# Patient Record
Sex: FEMALE | Race: WHITE | NOT HISPANIC OR LATINO | URBAN - METROPOLITAN AREA
[De-identification: names, ages, dates, MRNs, and addresses within clinical notes are randomized per-mention and may not be internally consistent; named-entity substitution may affect disease eponyms.]

---

## 2019-06-25 ENCOUNTER — INPATIENT (INPATIENT)
Facility: HOSPITAL | Age: 24
LOS: 1 days | Discharge: HOME | End: 2019-06-27
Attending: PSYCHIATRY & NEUROLOGY | Admitting: PSYCHIATRY & NEUROLOGY
Payer: COMMERCIAL

## 2019-06-25 VITALS
RESPIRATION RATE: 14 BRPM | TEMPERATURE: 98 F | SYSTOLIC BLOOD PRESSURE: 120 MMHG | DIASTOLIC BLOOD PRESSURE: 73 MMHG | OXYGEN SATURATION: 99 % | HEART RATE: 108 BPM

## 2019-06-25 DIAGNOSIS — F90.0 ATTENTION-DEFICIT HYPERACTIVITY DISORDER, PREDOMINANTLY INATTENTIVE TYPE: ICD-10-CM

## 2019-06-25 DIAGNOSIS — F33.9 MAJOR DEPRESSIVE DISORDER, RECURRENT, UNSPECIFIED: ICD-10-CM

## 2019-06-25 DIAGNOSIS — F41.0 PANIC DISORDER [EPISODIC PAROXYSMAL ANXIETY]: ICD-10-CM

## 2019-06-25 DIAGNOSIS — F33.2 MAJOR DEPRESSIVE DISORDER, RECURRENT SEVERE WITHOUT PSYCHOTIC FEATURES: ICD-10-CM

## 2019-06-25 LAB
ALBUMIN SERPL ELPH-MCNC: 4.9 G/DL — SIGNIFICANT CHANGE UP (ref 3.3–5)
ALP SERPL-CCNC: 89 U/L — SIGNIFICANT CHANGE UP (ref 40–120)
ALT FLD-CCNC: 51 U/L — HIGH (ref 4–33)
AMPHET UR-MCNC: NEGATIVE — SIGNIFICANT CHANGE UP
ANION GAP SERPL CALC-SCNC: 17 MMO/L — HIGH (ref 7–14)
APAP SERPL-MCNC: < 15 UG/ML — LOW (ref 15–25)
APPEARANCE UR: SIGNIFICANT CHANGE UP
AST SERPL-CCNC: 21 U/L — SIGNIFICANT CHANGE UP (ref 4–32)
BACTERIA # UR AUTO: NEGATIVE — SIGNIFICANT CHANGE UP
BARBITURATES UR SCN-MCNC: NEGATIVE — SIGNIFICANT CHANGE UP
BENZODIAZ UR-MCNC: NEGATIVE — SIGNIFICANT CHANGE UP
BILIRUB SERPL-MCNC: < 0.2 MG/DL — LOW (ref 0.2–1.2)
BILIRUB UR-MCNC: NEGATIVE — SIGNIFICANT CHANGE UP
BLOOD UR QL VISUAL: NEGATIVE — SIGNIFICANT CHANGE UP
BUN SERPL-MCNC: 11 MG/DL — SIGNIFICANT CHANGE UP (ref 7–23)
CALCIUM SERPL-MCNC: 9.7 MG/DL — SIGNIFICANT CHANGE UP (ref 8.4–10.5)
CANNABINOIDS UR-MCNC: NEGATIVE — SIGNIFICANT CHANGE UP
CHLORIDE SERPL-SCNC: 103 MMOL/L — SIGNIFICANT CHANGE UP (ref 98–107)
CO2 SERPL-SCNC: 21 MMOL/L — LOW (ref 22–31)
COCAINE METAB.OTHER UR-MCNC: NEGATIVE — SIGNIFICANT CHANGE UP
COLOR SPEC: YELLOW — SIGNIFICANT CHANGE UP
CREAT SERPL-MCNC: 0.54 MG/DL — SIGNIFICANT CHANGE UP (ref 0.5–1.3)
ETHANOL BLD-MCNC: 206 MG/DL — HIGH
GLUCOSE SERPL-MCNC: 100 MG/DL — HIGH (ref 70–99)
GLUCOSE UR-MCNC: NEGATIVE — SIGNIFICANT CHANGE UP
HCG SERPL-ACNC: < 5 MIU/ML — SIGNIFICANT CHANGE UP
HCT VFR BLD CALC: 38.6 % — SIGNIFICANT CHANGE UP (ref 34.5–45)
HGB BLD-MCNC: 12.8 G/DL — SIGNIFICANT CHANGE UP (ref 11.5–15.5)
HYALINE CASTS # UR AUTO: NEGATIVE — SIGNIFICANT CHANGE UP
KETONES UR-MCNC: SIGNIFICANT CHANGE UP
LEUKOCYTE ESTERASE UR-ACNC: NEGATIVE — SIGNIFICANT CHANGE UP
MCHC RBC-ENTMCNC: 30.6 PG — SIGNIFICANT CHANGE UP (ref 27–34)
MCHC RBC-ENTMCNC: 33.2 % — SIGNIFICANT CHANGE UP (ref 32–36)
MCV RBC AUTO: 92.3 FL — SIGNIFICANT CHANGE UP (ref 80–100)
METHADONE UR-MCNC: NEGATIVE — SIGNIFICANT CHANGE UP
NITRITE UR-MCNC: NEGATIVE — SIGNIFICANT CHANGE UP
NRBC # FLD: 0 K/UL — SIGNIFICANT CHANGE UP (ref 0–0)
OPIATES UR-MCNC: NEGATIVE — SIGNIFICANT CHANGE UP
OXYCODONE UR-MCNC: NEGATIVE — SIGNIFICANT CHANGE UP
PCP UR-MCNC: NEGATIVE — SIGNIFICANT CHANGE UP
PH UR: 7 — SIGNIFICANT CHANGE UP (ref 5–8)
PLATELET # BLD AUTO: 345 K/UL — SIGNIFICANT CHANGE UP (ref 150–400)
PMV BLD: 8 FL — SIGNIFICANT CHANGE UP (ref 7–13)
POTASSIUM SERPL-MCNC: 4.1 MMOL/L — SIGNIFICANT CHANGE UP (ref 3.5–5.3)
POTASSIUM SERPL-SCNC: 4.1 MMOL/L — SIGNIFICANT CHANGE UP (ref 3.5–5.3)
PROT SERPL-MCNC: 6.9 G/DL — SIGNIFICANT CHANGE UP (ref 6–8.3)
PROT UR-MCNC: NEGATIVE — SIGNIFICANT CHANGE UP
RBC # BLD: 4.18 M/UL — SIGNIFICANT CHANGE UP (ref 3.8–5.2)
RBC # FLD: 12.4 % — SIGNIFICANT CHANGE UP (ref 10.3–14.5)
RBC CASTS # UR COMP ASSIST: SIGNIFICANT CHANGE UP (ref 0–?)
SALICYLATES SERPL-MCNC: < 5 MG/DL — LOW (ref 15–30)
SODIUM SERPL-SCNC: 141 MMOL/L — SIGNIFICANT CHANGE UP (ref 135–145)
SP GR SPEC: 1.01 — SIGNIFICANT CHANGE UP (ref 1–1.04)
SQUAMOUS # UR AUTO: SIGNIFICANT CHANGE UP
TSH SERPL-MCNC: 7.43 UIU/ML — HIGH (ref 0.27–4.2)
UROBILINOGEN FLD QL: NORMAL — SIGNIFICANT CHANGE UP
WBC # BLD: 6.32 K/UL — SIGNIFICANT CHANGE UP (ref 3.8–10.5)
WBC # FLD AUTO: 6.32 K/UL — SIGNIFICANT CHANGE UP (ref 3.8–10.5)
WBC UR QL: SIGNIFICANT CHANGE UP (ref 0–?)

## 2019-06-25 PROCEDURE — 99285 EMERGENCY DEPT VISIT HI MDM: CPT

## 2019-06-25 RX ORDER — IBUPROFEN 200 MG
400 TABLET ORAL EVERY 8 HOURS
Refills: 0 | Status: DISCONTINUED | OUTPATIENT
Start: 2019-06-25 | End: 2019-06-27

## 2019-06-25 RX ORDER — LEVOTHYROXINE SODIUM 125 MCG
175 TABLET ORAL DAILY
Refills: 0 | Status: DISCONTINUED | OUTPATIENT
Start: 2019-06-26 | End: 2019-06-27

## 2019-06-25 RX ORDER — LISDEXAMFETAMINE DIMESYLATE 70 MG/1
1 CAPSULE ORAL
Qty: 0 | Refills: 0 | DISCHARGE

## 2019-06-25 RX ORDER — DULOXETINE HYDROCHLORIDE 30 MG/1
60 CAPSULE, DELAYED RELEASE ORAL ONCE
Refills: 0 | Status: COMPLETED | OUTPATIENT
Start: 2019-06-25 | End: 2019-06-25

## 2019-06-25 RX ORDER — DULOXETINE HYDROCHLORIDE 30 MG/1
30 CAPSULE, DELAYED RELEASE ORAL DAILY
Refills: 0 | Status: DISCONTINUED | OUTPATIENT
Start: 2019-06-25 | End: 2019-06-25

## 2019-06-25 RX ORDER — LEVOTHYROXINE SODIUM 125 MCG
175 TABLET ORAL ONCE
Refills: 0 | Status: COMPLETED | OUTPATIENT
Start: 2019-06-25 | End: 2019-06-25

## 2019-06-25 RX ORDER — DULOXETINE HYDROCHLORIDE 30 MG/1
1 CAPSULE, DELAYED RELEASE ORAL
Qty: 0 | Refills: 0 | DISCHARGE

## 2019-06-25 RX ORDER — DULOXETINE HYDROCHLORIDE 30 MG/1
60 CAPSULE, DELAYED RELEASE ORAL DAILY
Refills: 0 | Status: DISCONTINUED | OUTPATIENT
Start: 2019-06-26 | End: 2019-06-27

## 2019-06-25 RX ORDER — LISDEXAMFETAMINE DIMESYLATE 70 MG/1
70 CAPSULE ORAL DAILY
Refills: 0 | Status: CANCELLED | OUTPATIENT
Start: 2019-06-28 | End: 2019-06-27

## 2019-06-25 RX ADMIN — Medication 175 MICROGRAM(S): at 16:29

## 2019-06-25 RX ADMIN — Medication 0.5 MILLIGRAM(S): at 21:40

## 2019-06-25 RX ADMIN — DULOXETINE HYDROCHLORIDE 60 MILLIGRAM(S): 30 CAPSULE, DELAYED RELEASE ORAL at 16:29

## 2019-06-25 RX ADMIN — Medication 400 MILLIGRAM(S): at 14:02

## 2019-06-25 NOTE — ED BEHAVIORAL HEALTH ASSESSMENT NOTE - PSYCHIATRIC ISSUES AND PLAN (INCLUDE STANDING AND PRN MEDICATION)
taper off cymbalta and consider starting wellbutrin (per patient, good familial response). continue Vyvanse and prn ativan.

## 2019-06-25 NOTE — ED ADULT NURSE NOTE - NS PRO PASSIVE SMOKE EXP
No further bleeding from rectum.  Continue with xarelto.  
Ventricular rates uncontrolled at today's visit.  Will increase Toprol to 50mg BID.  Return in 1 week for evaluation of heart rates.  
Unknown

## 2019-06-25 NOTE — ED ADULT NURSE NOTE - NSIMPLEMENTINTERV_GEN_ALL_ED
Implemented All Universal Safety Interventions:  [place] to call system. Call bell, personal items and telephone within reach. Instruct patient to call for assistance. Room bathroom lighting operational. Non-slip footwear when patient is off stretcher. Physically safe environment: no spills, clutter or unnecessary equipment. Stretcher in lowest position, wheels locked, appropriate side rails in place.

## 2019-06-25 NOTE — ED ADULT NURSE REASSESSMENT NOTE - NS ED NURSE REASSESS COMMENT FT1
pt transported to [facility] with security and PCA pascle. pt is calm and cooperative. pts belongings with family member.

## 2019-06-25 NOTE — ED BEHAVIORAL HEALTH ASSESSMENT NOTE - OTHER PAST PSYCHIATRIC HISTORY (INCLUDE DETAILS REGARDING ONSET, COURSE OF ILLNESS, INPATIENT/OUTPATIENT TREATMENT)
PPH of ADHD and anxiety, no prior inpatient admissions, no prior SA/SIB until today, in treatment with Dr. Salcido

## 2019-06-25 NOTE — ED BEHAVIORAL HEALTH ASSESSMENT NOTE - SUICIDE PROTECTIVE FACTORS
Engaged in work or school/Responsibility to family and others/Supportive social network or family/Future oriented/Identifies reasons for living

## 2019-06-25 NOTE — ED BEHAVIORAL HEALTH ASSESSMENT NOTE - SUMMARY
25yo  female, single, domiciled in an apartment in [place], employed as a  in [place], PPH of ADHD and anxiety, no prior inpatient admissions, no prior SA/SIB until today, in treatment with Dr. Salcido, no significant history of substance abuse, PMH of Graves Disease, brought in by mother for evaluation of depression and anxiety after patient cut her abdomen several times.     Patient presents with her mother after superficially cutting her abdomen. Patient cannot clearly state whether this was a suicide attempt or out of frustration. She and mother both report that patient has been on the decline for the past two months and is not at her baseline. Both patient and mother requesting voluntary admission at this time for safety and stabilization.

## 2019-06-25 NOTE — ED BEHAVIORAL HEALTH NOTE - BEHAVIORAL HEALTH NOTE
ED psychiatric reassessment    CC: "Been depressed for the past month."    S:  25yo  female, single, domiciled in an apartment in [place], employed as a  in [place], PPH of ADHD and anxiety, no prior inpatient admissions, no prior SA/SIB until today, in treatment with Dr. Salcido, no significant history of substance abuse, PMH of Graves Disease, brought in by mother for evaluation of depression and anxiety after patient cut her abdomen several times.  Patient presented with her mother after superficially cutting her abdomen. Patient cannot clearly state whether this was a suicide attempt or out of frustration. She and mother both report that patient has been on the decline for the past two months and is not at her baseline. Both patient and mother requesting voluntary admission at this time for safety and stabilization which patient is appropriate for and agreeable to.    Vital Signs Last 24 Hrs  T(C): 37 (25 Jun 2019 08:01), Max: 37 (25 Jun 2019 08:01)  T(F): 98.6 (25 Jun 2019 08:01), Max: 98.6 (25 Jun 2019 08:01)  HR: 95 (25 Jun 2019 10:12) (95 - 108)  BP: 114/62 (25 Jun 2019 10:12) (106/47 - 120/73)  BP(mean): --  RR: 18 (25 Jun 2019 10:12) (14 - 18)  SpO2: 100% (25 Jun 2019 10:12) (99% - 100%)    · Risk Assessment	moderate to high risk for self harm, requires inpatient admission for safety and stabilization.     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   Secondary Dx 1 ADHD (attention deficit hyperactivity disorder), inattentive type F90.0.   Secondary Dx 2 Panic attacks F41.0.    Plan:  Admit to [address] on a 9.13 legal status, no need for constant observation in a locked, supervised setting.    Although ETOH on board on presentation, low risk of withdrawal, no indication for CIWA  Recommend transport to unit accompanied by security for safety.  Parents informed and provided unit information.  Care is coordinated with EM provider, Dr. Cedillo.  Verbal handoff provided  to Dr. Parr via confidential voicemail.  Non-formulary request for Vyvanse 70mg daily submitted. ED psychiatric reassessment    CC: "Been depressed for the past month."    S:  25yo  female, single, domiciled in an apartment in [place], employed as a  in [place], PPH of ADHD and anxiety, no prior inpatient admissions, no prior SA/SIB until today, in treatment with Dr. Salcido, no significant history of substance abuse, PMH of Graves Disease, brought in by mother for evaluation of depression and anxiety after patient cut her abdomen several times.  Patient presented with her mother after superficially cutting her abdomen. Patient cannot clearly state whether this was a suicide attempt or out of frustration. She and mother both report that patient has been on the decline for the past two months and is not at her baseline. Both patient and mother requesting voluntary admission at this time for safety and stabilization which patient is appropriate for and agreeable to.    Vital Signs Last 24 Hrs  T(C): 37 (25 Jun 2019 08:01), Max: 37 (25 Jun 2019 08:01)  T(F): 98.6 (25 Jun 2019 08:01), Max: 98.6 (25 Jun 2019 08:01)  HR: 95 (25 Jun 2019 10:12) (95 - 108)  BP: 114/62 (25 Jun 2019 10:12) (106/47 - 120/73)  BP(mean): --  RR: 18 (25 Jun 2019 10:12) (14 - 18)  SpO2: 100% (25 Jun 2019 10:12) (99% - 100%)    · Risk Assessment	moderate to high risk for self harm, requires inpatient admission for safety and stabilization.     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   Secondary Dx 1 ADHD (attention deficit hyperactivity disorder), inattentive type F90.0.   Secondary Dx 2 Panic attacks F41.0.    I-Stop database reviewed: Reference #: [id number]   Vyvanse dose confirmed    Plan:  Admit to [facility] on a 9.13 legal status, no need for constant observation in a locked, supervised setting.    Although ETOH on board on presentation, low risk of withdrawal, no indication for CIWA  Recommend transport to unit accompanied by security for safety.  Parents informed and provided unit information.  Care is coordinated with EM provider, Dr. Cedillo.  Verbal handoff provided  to Dr. Parr via confidential voicemail.  Non-formulary request for Vyvanse 70mg daily submitted.

## 2019-06-25 NOTE — ED PROVIDER NOTE - CLINICAL SUMMARY MEDICAL DECISION MAKING FREE TEXT BOX
pt with SI and superficial abrasions - tetanus is UTD.  Will clear medically from other ingestions.   consultation.  Will dipso in conjunction with them.

## 2019-06-25 NOTE — ED BEHAVIORAL HEALTH ASSESSMENT NOTE - DESCRIPTION
graves disease lives alone in [place] tearful, but calm and cooperative  ICU Vital Signs Last 24 Hrs  T(C): 36.9 (25 Jun 2019 04:25), Max: 36.9 (25 Jun 2019 04:25)  T(F): 98.5 (25 Jun 2019 04:25), Max: 98.5 (25 Jun 2019 04:25)  HR: 108 (25 Jun 2019 04:25) (108 - 108)  BP: 120/73 (25 Jun 2019 04:25) (120/73 - 120/73)  BP(mean): --  ABP: --  ABP(mean): --  RR: 14 (25 Jun 2019 04:25) (14 - 14)  SpO2: 99% (25 Jun 2019 04:25) (99% - 99%)

## 2019-06-25 NOTE — ED ADULT TRIAGE NOTE - CHIEF COMPLAINT QUOTE
Pt arrives to ED c/o suicidal ideation. Pt arrives with superficial cutting to abdomen, no active bleeding. Pt states has been depressed over last month. No suicidal attempts in the past. Pt reports hx of anxiety and depression, follows with psychiatrist. Pt reports drinking approximately 4 alcoholic drinks yesterday, last drink 11pm. No slurred speech, is a&ox4. Denies drug use. Denies hallucinations. Denies homicidal ideation. Pt walked to ambulance bay, to be seen by MD Shah.
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
## 2019-06-25 NOTE — ED BEHAVIORAL HEALTH ASSESSMENT NOTE - HPI (INCLUDE ILLNESS QUALITY, SEVERITY, DURATION, TIMING, CONTEXT, MODIFYING FACTORS, ASSOCIATED SIGNS AND SYMPTOMS)
23yo  female, single, domiciled in an apartment in [place], employed as a  in [place], PPH of ADHD and anxiety, no prior inpatient admissions, no prior SA/SIB until today, in treatment with Dr. Salcido, no significant history of substance abuse, PMH of Graves Disease, brought in by mother for evaluation of depression and anxiety after patient cut her abdomen several times.     Patient interviewed alone. She is tearful and reports that she has been "super overwhelmed" as of late. She reports that nothing happened tonight to trigger her cutting herself. She reports that she was cutting an apple and fell, cutting her abdomen with a knife. She reports then becoming tearful and thinking that she should just continue cutting. She reports that she has been on cymbalta for the past 2.5 years. She feels it is not effective and that she has been more anxious lately. She recently moved to [place] in October and started working as a  - she denies stress/trigger from work. She reports that she has had an on and off relationship with an ex,  but states that it would not make her want to harm herself. She reports worsening panic attacks for the past two months, having them as often as twice per week. They can last from a few minutes up to 40 minutes. It is characterized by feeling like she is suffocating, shaking and she will get physically sick at times. She reports that her body feels "dead" afterwards. She admits to having passive suicidal ideation of not wanting to be around or live like this - denies any plan or intent. She reports she is unsure if the cutting tonight was a suicide attempt or done out of frustration. She reports that sleep is "hit or miss" depending on the day. Reports 25lb weight gain over the past two months, which has made her feel worse about herself. Patient denies manic symptoms including elevated mood, increased irritability, mood lability, distractibility, grandiosity, pressured speech, increase in goal-directed activity, or decreased need for sleep. Patient denies any psychotic symptoms including paranoia, ideas of reference, thought insertion/broadcasting, or auditory/visual/olfactory/tactile/gustatory hallucinations. Denies any substance use.     Collateral obtained from patient's mother in person. She reports that she was asleep when the patient woke her up telling her she slipped and cut herself with a knife in the kitchen (at 3am). Mother reports that she cleaned the cuts with hydrogen peroxide and confronted the patient, telling her that the cuts (more than one) did not match the story. Patient then broke down and started crying. Mother reports that she did not leave the patient alone afterward, despite patient's requests for her to do so. Mother reports seeing the patient struggle for the past two months. She has seen patient pull away, isolate herself, gain weight and overall generally not appear like her baseline self. She reports that the patient and her ex (who mother feels is controlling) have an on and off again relationship, which may be a trigger. She reports patient's relationship with her father is also a struggle as he makes patient feel bad about herself. Mother reports she is very concerned and advocating for inpatient admission at this time.

## 2019-06-25 NOTE — ED PROVIDER NOTE - PROGRESS NOTE DETAILS
BH eval of patient - they will admit to [facility] on a voluntary.  1:1 to be maintained.  Pending bed and medical clearance Dr. Cedillo: Pt was signed out to me awaiting Psych admission. Pt currently resting in [facility]. Dr. Cedillo: Psych will admit to [facility].

## 2019-06-25 NOTE — ED PROVIDER NOTE - PHYSICAL EXAMINATION
Gen: Well appearing, tearful in NAD  Head: NC/AT  Neck: trachea midline  Resp:  No distress  Abd: soft NT ND  Ext: no deformities  Neuro:  A&O appears non focal  Skin:  Warm and dry as visualized except for a number of superficial abrasions in the area of the umbilicus.  No active bleeding or gaping of wound  Psych:  Flat affect and sad mood

## 2019-06-25 NOTE — ED BEHAVIORAL HEALTH ASSESSMENT NOTE - OTHER
not observed, laying in bed tearful relationship issues [id number] I-Stop database Reference #: [id number]

## 2019-06-25 NOTE — ED ADULT NURSE NOTE - OBJECTIVE STATEMENT
A&Ox3 ambulatory presents with suicidal ideation, depression, & anxiety with panic attacks over the past month with superficial cutting on abdomen brought in by her mother. No past admissions. Denies drug use. Patient is calm & cooperative at this time, belongings secured & given to family member. 1:1 initiated & maintained.

## 2019-06-25 NOTE — ED ADULT NURSE REASSESSMENT NOTE - NS ED NURSE REASSESS COMMENT FT1
report given to ALAYNA wetzel at [facility]. pt is awake, calm and cooperative. pt denies SI, visual or auditory disturbances. pt awaiting transport to [facility]. pt remains on 1:1 with pca pascle. environment checked for safety, safety maintained. will continue to monitor.

## 2019-06-25 NOTE — ED PROVIDER NOTE - OBJECTIVE STATEMENT
25 yo with no sig PMH presenting after self injurious behaviors tonight.  Became very upset and had some hopeless feelings and took a knife and inflicted a number of superficial lacerations to her abdomen.  Is scared that she might do it again.  No history of the same.  No previous hospitalizations.  Denies any other ingestions.

## 2019-06-26 RX ADMIN — Medication 175 MICROGRAM(S): at 09:34

## 2019-06-26 RX ADMIN — DULOXETINE HYDROCHLORIDE 60 MILLIGRAM(S): 30 CAPSULE, DELAYED RELEASE ORAL at 09:34

## 2019-06-26 RX ADMIN — Medication 0.5 MILLIGRAM(S): at 22:11

## 2019-06-27 VITALS — TEMPERATURE: 99 F

## 2019-06-27 RX ORDER — LEVOTHYROXINE SODIUM 125 MCG
1 TABLET ORAL
Qty: 14 | Refills: 0
Start: 2019-06-27 | End: 2019-07-10

## 2019-06-27 RX ORDER — DULOXETINE HYDROCHLORIDE 30 MG/1
1 CAPSULE, DELAYED RELEASE ORAL
Qty: 0 | Refills: 0 | DISCHARGE
Start: 2019-06-27

## 2019-06-27 RX ORDER — LEVOTHYROXINE SODIUM 125 MCG
150 TABLET ORAL
Qty: 0 | Refills: 0 | DISCHARGE
Start: 2019-06-27

## 2019-06-27 RX ADMIN — DULOXETINE HYDROCHLORIDE 60 MILLIGRAM(S): 30 CAPSULE, DELAYED RELEASE ORAL at 09:41

## 2019-06-27 RX ADMIN — Medication 175 MICROGRAM(S): at 09:41

## 2019-06-27 RX ADMIN — Medication 400 MILLIGRAM(S): at 10:36

## 2019-06-27 RX ADMIN — Medication 400 MILLIGRAM(S): at 10:06

## 2020-05-13 PROBLEM — Z78.9 OTHER SPECIFIED HEALTH STATUS: Chronic | Status: ACTIVE | Noted: 2019-06-25

## 2020-05-14 ENCOUNTER — APPOINTMENT (OUTPATIENT)
Dept: ENDOCRINOLOGY | Facility: CLINIC | Age: 25
End: 2020-05-14
Payer: COMMERCIAL

## 2020-05-14 VITALS
WEIGHT: 173 LBS | HEIGHT: 65 IN | HEART RATE: 102 BPM | DIASTOLIC BLOOD PRESSURE: 86 MMHG | BODY MASS INDEX: 28.82 KG/M2 | SYSTOLIC BLOOD PRESSURE: 120 MMHG

## 2020-05-14 DIAGNOSIS — Z80.9 FAMILY HISTORY OF MALIGNANT NEOPLASM, UNSPECIFIED: ICD-10-CM

## 2020-05-14 DIAGNOSIS — Z87.891 PERSONAL HISTORY OF NICOTINE DEPENDENCE: ICD-10-CM

## 2020-05-14 DIAGNOSIS — Z83.3 FAMILY HISTORY OF DIABETES MELLITUS: ICD-10-CM

## 2020-05-14 DIAGNOSIS — Z83.49 FAMILY HISTORY OF OTHER ENDOCRINE, NUTRITIONAL AND METABOLIC DISEASES: ICD-10-CM

## 2020-05-14 DIAGNOSIS — Z87.19 PERSONAL HISTORY OF OTHER DISEASES OF THE DIGESTIVE SYSTEM: ICD-10-CM

## 2020-05-14 DIAGNOSIS — Z86.59 PERSONAL HISTORY OF OTHER MENTAL AND BEHAVIORAL DISORDERS: ICD-10-CM

## 2020-05-14 PROCEDURE — 99204 OFFICE O/P NEW MOD 45 MIN: CPT

## 2020-05-14 RX ORDER — LISDEXAMFETAMINE DIMESYLATE 70 MG/1
70 CAPSULE ORAL DAILY
Refills: 0 | Status: ACTIVE | COMMUNITY

## 2020-05-14 RX ORDER — SERTRALINE HYDROCHLORIDE 100 MG/1
100 TABLET, FILM COATED ORAL DAILY
Refills: 0 | Status: ACTIVE | COMMUNITY

## 2020-05-14 RX ORDER — SERTRALINE HYDROCHLORIDE 50 MG/1
50 TABLET, FILM COATED ORAL DAILY
Refills: 0 | Status: ACTIVE | COMMUNITY

## 2020-05-14 RX ORDER — LIOTHYRONINE SODIUM 5 UG/1
5 TABLET ORAL DAILY
Refills: 0 | Status: DISCONTINUED | COMMUNITY
End: 2020-05-14

## 2020-05-14 NOTE — HISTORY OF PRESENT ILLNESS
[FreeTextEntry1] : Here to establish care for hypothyroidism.   Developed hypothyroidism about 12 years ago after I-131 therapy.   She has always required a high dose of LT4 to maintain normal thyroid function and was doing well on Synthroid 175 mcg daily for many years, until her dose was reduced about one year ago (at the time her TSH was in normal range at 2).  Since then she has struggled to regain good control of her hypothyroidism.  She  follows proper dosing administration of LT4 and has good compliance.    Within the past year her Synthroid was switched to Tirosint, but TSH increased further.  Dose of Tirosint was increased and Cytomel was added due to low normal T3 level and she has had some improvement in TFTs, but not yet normal. \par \par Quality:  Primary hypothyroidism secondary to I-131 therapy for Grave's Disease\par Severity:  moderate\par Duration:  12 years\par Associated Symptoms:  Less fatigue now, but still with fatigue.   Weight gain of 40 pounds over the past year.  \par Modifying factors:  Better with LT4\par \par Current Regimen:\par Tirosint 200 mcg daily (dose increased about 6 weeks ago from 175 mcg daily).\par Cytomel 5 mcg daily added earlier this year.\par \par Reports frequent diarrhea and cramping.  Has had GI work up including Sigmoidoscopy several years ago which was negative.  \par

## 2020-05-14 NOTE — REVIEW OF SYSTEMS
[Recent Weight Gain (___ Lbs)] : recent weight gain: [unfilled] lbs [Fatigue] : fatigue [Palpitations] : palpitations [Diarrhea] : diarrhea [Abdominal Pain] : abdominal pain [Depression] : depression [Heat Intolerance] : heat intolerance [Neck Pain] : no neck pain [Shortness Of Breath] : no shortness of breath [Irregular Menses] : regular menses [Hair Loss] : no hair loss [Tremors] : no tremors [Cold Intolerance] : no cold intolerance

## 2020-05-14 NOTE — ASSESSMENT
[FreeTextEntry1] : 25 year old female with hypothyroidism secondary to I-131 for Grave's Disease in the past.  Her hypothyroidism has been difficult to control over the past year, which may be related to irregular absorption in setting of frequent diarrhea.  She also has a history of vitamin D deficiency as well as NAFLD.\par \par 1.  Hypothyroidism-  she seems to have had a clinical response to T3 therapy, however T3 levels are still in low normal range, which raises the question of poor T4 --> T3 conversion.  Will increase Cytomel to 12.5 mcg daily and keep Tirosint at 200 mcg daily.  If we can regain good control of hypothyroidism then consider changing Tirosint back to Synthroid as she previously did well on this.  I will also run a celiac disease panel to exclude active celiac disease affection absorption.  If the above measures fail to improve control, I will likely perform an in office LT4 loading test, which was discussed with the patient at this visit.    Repeat TFTs in 6 weeks.  \par 2.  Vitamin D deficiency-  check level with next labs and may need Rx.\par 3.  NAFLD-  discussed the need for weight loss, diet and exercise.  Will repeat LFTs with next labs.

## 2020-05-14 NOTE — PHYSICAL EXAM
[Well Nourished] : well nourished [Healthy Appearance] : healthy appearance [No Acute Distress] : no acute distress [Normal Sclera/Conjunctiva] : normal sclera/conjunctiva [No Proptosis] : no proptosis [No Neck Mass] : no neck mass was observed [No LAD] : no lymphadenopathy [Thyroid Not Enlarged] : the thyroid was not enlarged [No Thyroid Nodules] : no palpable thyroid nodules [No Respiratory Distress] : no respiratory distress [Normal Rate and Effort] : normal respiratory rate and effort [Clear to Auscultation] : lungs were clear to auscultation bilaterally [Normal S1, S2] : normal S1 and S2 [No Murmurs] : no murmurs [Normal Rate] : heart rate was normal [Regular Rhythm] : with a regular rhythm [No Edema] : no peripheral edema [Normal Gait] : normal gait [No Clubbing, Cyanosis] : no clubbing  or cyanosis of the fingernails [No Tremors] : no tremors [Normal Affect] : the affect was normal [Normal Insight/Judgement] : insight and judgment were intact [Normal Mood] : the mood was normal [de-identified] : Biceps DTRs 2 + b/l

## 2020-06-24 ENCOUNTER — LABORATORY RESULT (OUTPATIENT)
Age: 25
End: 2020-06-24

## 2020-06-25 ENCOUNTER — APPOINTMENT (OUTPATIENT)
Dept: ENDOCRINOLOGY | Facility: CLINIC | Age: 25
End: 2020-06-25
Payer: COMMERCIAL

## 2020-06-25 DIAGNOSIS — N94.6 DYSMENORRHEA, UNSPECIFIED: ICD-10-CM

## 2020-06-25 PROCEDURE — 36415 COLL VENOUS BLD VENIPUNCTURE: CPT

## 2020-06-26 ENCOUNTER — TRANSCRIPTION ENCOUNTER (OUTPATIENT)
Age: 25
End: 2020-06-26

## 2020-07-09 ENCOUNTER — APPOINTMENT (OUTPATIENT)
Dept: ENDOCRINOLOGY | Facility: CLINIC | Age: 25
End: 2020-07-09
Payer: COMMERCIAL

## 2020-07-09 PROCEDURE — 36415 COLL VENOUS BLD VENIPUNCTURE: CPT

## 2020-07-10 RX ORDER — LEVOTHYROXINE SODIUM 200 UG/1
200 CAPSULE ORAL DAILY
Refills: 0 | Status: DISCONTINUED | COMMUNITY
End: 2020-07-10

## 2020-07-20 LAB
T4 FREE SERPL-MCNC: 0.6 NG/DL
T4 FREE SERPL-MCNC: 1.3 NG/DL
T4 FREE SERPL-MCNC: 1.8 NG/DL
T4 FREE SERPL-MCNC: 1.9 NG/DL
T4 FREE SERPL-MCNC: 2.1 NG/DL
T4 FREE SERPL-MCNC: 2.3 NG/DL
T4 FREE SERPL-MCNC: 2.5 NG/DL
TSH SERPL-ACNC: 44.5 UIU/ML
TSH SERPL-ACNC: 50.1 UIU/ML
TSH SERPL-ACNC: 54.7 UIU/ML
TSH SERPL-ACNC: 64.3 UIU/ML
TSH SERPL-ACNC: 68.3 UIU/ML
TSH SERPL-ACNC: 74.2 UIU/ML
TSH SERPL-ACNC: 96.4 UIU/ML

## 2020-08-19 LAB
ALBUMIN SERPL ELPH-MCNC: 5 G/DL
ALP BLD-CCNC: 85 U/L
ALT SERPL-CCNC: 205 U/L
ANION GAP SERPL CALC-SCNC: 12 MMOL/L
AST SERPL-CCNC: 102 U/L
BASOPHILS # BLD AUTO: 0.03 K/UL
BASOPHILS NFR BLD AUTO: 0.5 %
BILIRUB SERPL-MCNC: 0.4 MG/DL
BUN SERPL-MCNC: 13 MG/DL
CALCIUM SERPL-MCNC: 10.2 MG/DL
CHLORIDE SERPL-SCNC: 104 MMOL/L
CO2 SERPL-SCNC: 23 MMOL/L
CREAT SERPL-MCNC: 0.63 MG/DL
EOSINOPHIL # BLD AUTO: 0.05 K/UL
EOSINOPHIL NFR BLD AUTO: 0.8 %
GLUCOSE SERPL-MCNC: 122 MG/DL
HCT VFR BLD CALC: 40.9 %
HGB BLD-MCNC: 13.5 G/DL
IMM GRANULOCYTES NFR BLD AUTO: 0.8 %
LYMPHOCYTES # BLD AUTO: 1.94 K/UL
LYMPHOCYTES NFR BLD AUTO: 30.8 %
MAN DIFF?: NORMAL
MCHC RBC-ENTMCNC: 31.5 PG
MCHC RBC-ENTMCNC: 33 GM/DL
MCV RBC AUTO: 95.3 FL
MONOCYTES # BLD AUTO: 0.49 K/UL
MONOCYTES NFR BLD AUTO: 7.8 %
NEUTROPHILS # BLD AUTO: 3.74 K/UL
NEUTROPHILS NFR BLD AUTO: 59.3 %
PLATELET # BLD AUTO: 348 K/UL
POTASSIUM SERPL-SCNC: 4.7 MMOL/L
PROT SERPL-MCNC: 7 G/DL
RBC # BLD: 4.29 M/UL
RBC # FLD: 12.8 %
SODIUM SERPL-SCNC: 139 MMOL/L
T3FREE SERPL-MCNC: 4.9 PG/ML
T3FREE SERPL-MCNC: 5.05 PG/ML
T4 FREE SERPL-MCNC: 1.7 NG/DL
T4 FREE SERPL-MCNC: 1.9 NG/DL
TSH SERPL-ACNC: 0.04 UIU/ML
TSH SERPL-ACNC: 0.04 UIU/ML
WBC # FLD AUTO: 6.3 K/UL

## 2020-08-19 RX ORDER — LIOTHYRONINE SODIUM 25 UG/1
25 TABLET ORAL DAILY
Qty: 45 | Refills: 1 | Status: DISCONTINUED | COMMUNITY
Start: 2020-05-14 | End: 2020-08-19

## 2020-09-09 ENCOUNTER — TRANSCRIPTION ENCOUNTER (OUTPATIENT)
Age: 25
End: 2020-09-09

## 2020-09-14 NOTE — ED ADULT NURSE NOTE - PAIN RATING/NUMBER SCALE (0-10): REST
CHIEF COMPLAINT:  Chief Complaint   Patient presents with   • Office Visit     follow up   • Other     HM: DM foot/ eye exam, hep B, colo screen, shingles, hep c, MWV, mammo, flu, GFR       HISTORY OF PRESENT ILLNESS:    Hypertension:  Blood pressuresare not checked by the patient. The patient complains of no cardiac symptoms   Patient denies chest pain , shortness of breath, peripheral edema.  Rare funny beat, no racing beats.   Shehas been taking her medicationregularly.  The patient is tolerating the medication. Exercise: active around house  She exercises rarely.  Tries to walk in the store and around the building.      Diabetes:  Her blood sugars have been fluctuating.  Blood sugars range from 77 to 235, averaging around 160-180.  Blood sugars are fluctuating throughout the day.  She is on no insulin.    Shehas been taking her medicationregularly. The patient does not experience low blood sugars.  The patient consistently watches her carbohydrate intake.     Hyperlipidemia: Currently on Atorvastatin (Lipitor),40 mg.  Geethahas been taking her medicationregularly. The patient is tolerating the medication. She is not having muscle aches.  The patient consistently watches her diet.    GERD:  She complains of heartburn, a sticking sensation in the throat, epigastric pain. .She denies trouble swallowing, nausea.  The patient needs to use her prn medication occasionally.  Using gaviscon few times a week.  Just has omeprazole twice a day.      Back pain, neuropathy:  Aches and pains are about the same.  Yesterday car trip was a little too much.  Using hydrocodone about 3 per day.  At times not using every day.  Managing it.  Using cyclobenzaprine as needed.  Using more often at night to help with sleep.  Using gabapentin.  Pain in lower back.  Left butt pain at times.  Feet will hurt if misses gabapentin.  Getting around ok.  Goes slow.  Using cane.  Using meloxicam once a day.  Using lidocaine for the back.       Risk 3-dose series) 12/09/1975   • Colorectal Cancer Screening-Colonoscopy  12/09/2006   • Shingles Vaccine (1 of 2) 12/09/2006   • Hepatitis C Screening  12/09/2007   • Medicare Wellness Visit  03/01/2015   • Breast Cancer Screening  10/07/2016   • Diabetes Eye Exam  06/13/2017   • Influenza Vaccine (1) 09/01/2020   • DM/CKD GFR  09/11/2020   • Diabetes A1C  10/07/2020   • Lung Cancer Screening  07/30/2021   • DTaP/Tdap/Td Vaccine (2 - Td) 12/11/2023   • Pneumococcal Vaccine 0-64  Completed   • Meningococcal Vaccine  Aged Out   • HPV Vaccine  Aged Out       VITALS:  Visit Vitals  /54   Pulse 64   Temp 96.4 °F (35.8 °C) (Temporal)   Ht 5' 2\" (1.575 m)   Wt 90.3 kg   SpO2 98%   BMI 36.40 kg/m²       PHYSICAL EXAM:  Constitutional:  Well developed, well nourished, no acute distress, non-toxic appearance   Eyes:  Pupils equal, round, reactive to light, conjunctivae normal   HENT:  Atraumatic, external ears normal, nose normal, oropharynx moist. Neck- normal range of motion, no tenderness, supple, no thyroid enlargement   Respiratory:  Normal respiratory rate and effort, normal breath sounds, no rales, no wheezing   Cardiovascular:  Normal rate, normal rhythm, no murmurs, no gallops, no rubs   Gastrointestinal:  Soft, nondistended, normal bowel sounds, nontender, no organomegaly.  Musculoskeletal:  No edema, lower back with paraspinal and spinal tenderness.  Normal deep tendon reflexes bilaterally patella and Achilles  Integument:  Well hydrated, no rashes   Lymphatic:  No lymphadenopathy noted   Neurologic:  Alert & oriented x 3  Psychiatric:  Speech and behavior appropriate, pleasant but somewhat anxious, emotional affect.  Diabetic Foot Exam Documentation     Abnormal Bilateral Foot Exam:  Right: Skin integrity is normal - no erythema, blisters, callosities, or ulcers . Dorsalis pedis pulse is present. Pressure sensation using the [place] is Decreased, 1/5.    Left: Skin integrity is normal - no  needed.  These lower to function and move.  Cyclobenzaprine for spasms.  Gabapentin to help with nerve pain.  Meloxicam as an anti-inflammatory.  Next step would be pain specialist.  Patient resistant to further treatments, cost is a major factor.    Diabetic polyneuropathy associated with type 2 diabetes mellitus (CMS/HCC)  Known diabetic neuropathy.  Continue gabapentin at the current dose.  Encouraged to wear shoes, protect her feet    Gastroesophageal reflux disease without esophagitis  Patient's acid reflux disease well controlled. Will continue current medicines at the current dose. Patient encouraged to watch lifestyle measures that worsen acid reflux.    - famotidine (PEPCID) 20 MG tablet; Take 2 tablets by mouth daily.  Dispense: 180 tablet; Refill: 1    Essential hypertension, benign  Patient's blood pressure is well controlled. Patient tolerating antihypertensive medicines well. Will continue current medications at the current dose. Encouraged an active lifestyle.      Pure hypercholesterolemia  Patient's hypercholesterolemia well controlled with current medications and/or lifestyle measures. Patient tolerating current treatment. We'll continue current treatment. Encouraged to watch the fat and cholesterol in their diet.      Medication monitoring encounter    - BASIC METABOLIC PANEL; Future    Need for hepatitis C screening test    - HEPATITIS C ANTIBODY WITH REFLEX; Future      Patient Instructions   Hypertension:    Check blood pressure 1-2 times a month.  Goal for blood pressure is less than 140/90.  To decrease blood pressure :    Have less salt in your diet.  Especially watch for salt in pre-prepared foods.  Food in boxes or cans usually are high in salt. Eat out less, restaurant food often is high in salt.  Exercise more, aerobic or cardiovascular exercise 20-30 minutes at a time, 4-5 times per week.  Decrease stress, find good stress relievers.  Lose weight, even a few pounds can make a big  0 difference.  Avoid decongestants like sudafed or afrin.   Less stimulants like caffeine.      To improve blood sugars:  Diet:  Keep total carbohydrates at 45-60 grams per meal.  More fiber, less sugars/sweets.  Exercise DAILY.  Aerobic exercise 4-5 times per week.  Lose weight.  Eat 3 meals a day.  If you are very active have a snack.      Fasting in the morning   Before meals under 150    2 hours after a meal under 180-200    Metformin 1 with breakfast, take 2 with supper at night.        Acid    Omeprazole twice a day  Gaviscon as needed  Famotidine if acid gets worse.        Depression    Schedule in fun activities, have something to look forward to.  Find good support people.  Talk to them and use them when you're feeling stressed and down.  Exercise 3-4 times per week.  Have good stress relievers that help you relax, like music, talking, walking, bath/shower, yoga, meditation, crafts, fishing. Find what works for you.  Avoid bad stress relievers like tobacco, alcohol or too much ice cream.  Counsellers can be very helpful with depression and help to work through life's issues.  Consider medications when mood is affecting life such as:  problems enjoying fun things, problems being productive and getting things done, problems getting along with people.  Contact someone like family, [employer] or go to ER if feeling suicidal, especially if you have a plan for how you will commit suicide.        See eye doctor      The Shingles shot (Shingrix) is recommended for people over 50. The shot is very effective and safe. Cash price on it is around 300-400 dollars. Check with your insurance on coverage. Check on where it is best covered. The cheapest option may be a pharmacy or a  doctor's office.  In addition, shingles shots are hard to find right now.  Consider getting on a waiting list at a pharmacy.    Flu shot this year      Return in about 3 months (around 12/14/2020) for MWV, fasting labs 1 month.

## 2020-09-16 ENCOUNTER — LABORATORY RESULT (OUTPATIENT)
Age: 25
End: 2020-09-16

## 2020-09-17 ENCOUNTER — APPOINTMENT (OUTPATIENT)
Dept: ENDOCRINOLOGY | Facility: CLINIC | Age: 25
End: 2020-09-17
Payer: COMMERCIAL

## 2020-09-17 VITALS
OXYGEN SATURATION: 98 % | DIASTOLIC BLOOD PRESSURE: 62 MMHG | BODY MASS INDEX: 29.66 KG/M2 | HEIGHT: 65 IN | SYSTOLIC BLOOD PRESSURE: 126 MMHG | HEART RATE: 80 BPM | WEIGHT: 178 LBS | TEMPERATURE: 98.4 F

## 2020-09-17 PROCEDURE — 99214 OFFICE O/P EST MOD 30 MIN: CPT | Mod: 25

## 2020-09-17 PROCEDURE — 36415 COLL VENOUS BLD VENIPUNCTURE: CPT

## 2020-09-17 NOTE — ASSESSMENT
[FreeTextEntry1] : 25 year old female with hypothyroidism secondary to I-131 for Grave's Disease in the past. She also has a history of vitamin D deficiency as well as NAFLD.\par \par 1. Hypothyroidism-   check TFTs now and titrate LT4 as indicated.  Patient will repeat TFTs again in 6 weeks and follow up in office in 4 months.  \par 2. Vitamin D deficiency-  repeat level in future.  \par 3. NAFLD-  work on diet and weight loss.  Follow LFTs.

## 2020-09-17 NOTE — REVIEW OF SYSTEMS
Raul Dowling(Attending) [Recent Weight Gain (___ Lbs)] : no recent weight gain [Recent Weight Loss (___ Lbs)] : no recent weight loss [Neck Pain] : no neck pain [Palpitations] : no palpitations [Constipation] : no constipation [Insomnia] : no insomnia

## 2020-09-17 NOTE — PHYSICAL EXAM
[Well Nourished] : well nourished [Healthy Appearance] : healthy appearance [No Acute Distress] : no acute distress [Normal Sclera/Conjunctiva] : normal sclera/conjunctiva [No Proptosis] : no proptosis [No Neck Mass] : no neck mass was observed [No LAD] : no lymphadenopathy [Thyroid Not Enlarged] : the thyroid was not enlarged [No Thyroid Nodules] : no palpable thyroid nodules [No Respiratory Distress] : no respiratory distress [Clear to Auscultation] : lungs were clear to auscultation bilaterally [Normal S1, S2] : normal S1 and S2 [No Murmurs] : no murmurs [Normal Rate] : heart rate was normal [Regular Rhythm] : with a regular rhythm [Normal Affect] : the affect was normal [Normal Insight/Judgement] : insight and judgment were intact [Normal Mood] : the mood was normal

## 2020-09-17 NOTE — HISTORY OF PRESENT ILLNESS
[FreeTextEntry1] : Here for follow up of hypothyroidism.  History of difficulty maintaining normal TSH.  Had LT4 loading test and after 1000 mcg PO LT4, had significant rise in serum T4 and decrease in TSH, likely indicated normal absorption of LT4.\par \par Quality:  Primary hypothyroidism secondary to I-131 therapy for Grave's Disease\par Severity:  moderate\par Duration:  12 years\par Associated Symptoms:   Reports less fatigue.  \par Modifying factors:  Better with LT4\par \par Current Regimen:\par LT4 250 mcg daily\par Cytomel was D/C'ed due to lack of clinical benefit.  \par \par

## 2020-09-18 RX ORDER — LEVOTHYROXINE SODIUM 0.12 MG/1
125 TABLET ORAL DAILY
Qty: 180 | Refills: 1 | Status: DISCONTINUED | COMMUNITY
Start: 2020-07-10 | End: 2020-09-18

## 2020-10-16 ENCOUNTER — TRANSCRIPTION ENCOUNTER (OUTPATIENT)
Age: 25
End: 2020-10-16

## 2020-11-06 ENCOUNTER — TRANSCRIPTION ENCOUNTER (OUTPATIENT)
Age: 25
End: 2020-11-06

## 2020-11-25 ENCOUNTER — TRANSCRIPTION ENCOUNTER (OUTPATIENT)
Age: 25
End: 2020-11-25

## 2020-12-01 ENCOUNTER — TRANSCRIPTION ENCOUNTER (OUTPATIENT)
Age: 25
End: 2020-12-01

## 2020-12-07 ENCOUNTER — TRANSCRIPTION ENCOUNTER (OUTPATIENT)
Age: 25
End: 2020-12-07

## 2020-12-14 ENCOUNTER — TRANSCRIPTION ENCOUNTER (OUTPATIENT)
Age: 25
End: 2020-12-14

## 2020-12-30 ENCOUNTER — RX RENEWAL (OUTPATIENT)
Age: 25
End: 2020-12-30

## 2021-01-27 ENCOUNTER — APPOINTMENT (OUTPATIENT)
Dept: ENDOCRINOLOGY | Facility: CLINIC | Age: 26
End: 2021-01-27

## 2021-02-03 ENCOUNTER — RX RENEWAL (OUTPATIENT)
Age: 26
End: 2021-02-03

## 2021-02-04 ENCOUNTER — TRANSCRIPTION ENCOUNTER (OUTPATIENT)
Age: 26
End: 2021-02-04

## 2021-02-09 ENCOUNTER — APPOINTMENT (OUTPATIENT)
Dept: ENDOCRINOLOGY | Facility: CLINIC | Age: 26
End: 2021-02-09
Payer: COMMERCIAL

## 2021-02-09 VITALS
HEART RATE: 77 BPM | SYSTOLIC BLOOD PRESSURE: 110 MMHG | WEIGHT: 175 LBS | OXYGEN SATURATION: 97 % | HEIGHT: 66 IN | DIASTOLIC BLOOD PRESSURE: 70 MMHG | BODY MASS INDEX: 28.12 KG/M2

## 2021-02-09 DIAGNOSIS — E03.2 HYPOTHYROIDISM DUE TO MEDICAMENTS AND OTHER EXOGENOUS SUBSTANCES: ICD-10-CM

## 2021-02-09 DIAGNOSIS — E55.9 VITAMIN D DEFICIENCY, UNSPECIFIED: ICD-10-CM

## 2021-02-09 DIAGNOSIS — K76.0 FATTY (CHANGE OF) LIVER, NOT ELSEWHERE CLASSIFIED: ICD-10-CM

## 2021-02-09 PROCEDURE — 99214 OFFICE O/P EST MOD 30 MIN: CPT

## 2021-02-09 PROCEDURE — 99072 ADDL SUPL MATRL&STAF TM PHE: CPT

## 2021-02-09 NOTE — PHYSICAL EXAM
[Well Nourished] : well nourished [Healthy Appearance] : healthy appearance [No Acute Distress] : no acute distress [Normal Sclera/Conjunctiva] : normal sclera/conjunctiva [No Proptosis] : no proptosis [No Neck Mass] : no neck mass was observed [No LAD] : no lymphadenopathy [No Thyroid Nodules] : no palpable thyroid nodules [Thyroid Not Enlarged] : the thyroid was not enlarged [No Respiratory Distress] : no respiratory distress [Clear to Auscultation] : lungs were clear to auscultation bilaterally [Normal S1, S2] : normal S1 and S2 [No Murmurs] : no murmurs [Normal Rate] : heart rate was normal [Regular Rhythm] : with a regular rhythm [Normal Affect] : the affect was normal [Normal Insight/Judgement] : insight and judgment were intact [Normal Mood] : the mood was normal

## 2021-02-09 NOTE — ASSESSMENT
[FreeTextEntry1] : 26 year old female with hypothyroidism secondary to I-131 for Grave's Disease in the past. She also has a history of vitamin D deficiency as well as NAFLD.  She had a recent syncopal event while intoxicated that could have been vasovagal syncope.  \par \par 1.  Syncopal event-  given the circumstances, I suspect this may have been related to alcohol intoxication, dehydration and/ or vasovagal event.  Unlikely to be any underlying endocrine cause.  Reassurance provided.  If any further episodes occur or prodrome of seizure like symptoms, then would recommend a full neurologic evaluation.   \par 2. Hypothyroidism-    now euthyroid.  Obtain recent lab results and continue current dose of LT4.  \par 3. Vitamin D deficiency-   start vitamin D supplement.   \par 4. NAFLD-  work on diet and weight loss.   Recommended weight watchers.

## 2021-02-09 NOTE — HISTORY OF PRESENT ILLNESS
[FreeTextEntry1] : Here for follow up of hypothyroidism.  History of difficulty maintaining normal TSH.  Had LT4 loading test and after 1000 mcg PO LT4, had significant rise in serum T4 and decrease in TSH, likely indicated normal absorption of LT4.\par \par Quality:  Primary hypothyroidism secondary to I-131 therapy for Grave's Disease\par Severity:  moderate\par Duration:  12 years\par Associated Symptoms:   see below\par Modifying factors:  Better with LT4\par \par Current Regimen:\par LT4 224 mcg daily\par Cytomel was D/C'ed due to lack of clinical benefit.  \par \par Had a [place]  Was drinking EtOH and intoxicated at the time.  Became unresponsive and boyfriend reported she had convulsive like muscle movements lasting a minute or more.  EMS was called and she was brought to ER.  Had full evaluation done and final conclusion was likely vasovagal event.  Did not seem to have postictal symptoms.  TFTs were checked in ER and were normal.   \par

## 2021-02-09 NOTE — DATA REVIEWED
[FreeTextEntry1] : LABS:\par 11/20/2020:\par TSH  0.29\par Free T4  1.4\par Free T3  3.6\par AST  96\par ALT  197\par 25(OH)D  20\par \par 5/6/2020:\par  TSH  15.47\par Free T4  1.4\par Free T3  2.6

## 2021-04-06 ENCOUNTER — TRANSCRIPTION ENCOUNTER (OUTPATIENT)
Age: 26
End: 2021-04-06

## 2021-04-19 ENCOUNTER — RX RENEWAL (OUTPATIENT)
Age: 26
End: 2021-04-19

## 2021-05-05 ENCOUNTER — TRANSCRIPTION ENCOUNTER (OUTPATIENT)
Age: 26
End: 2021-05-05

## 2021-08-10 ENCOUNTER — APPOINTMENT (OUTPATIENT)
Dept: ENDOCRINOLOGY | Facility: CLINIC | Age: 26
End: 2021-08-10

## 2021-09-16 NOTE — ED BEHAVIORAL HEALTH ASSESSMENT NOTE - THOUGHT PROCESS
Patient : Lydia Koenig Age: 27 year old Sex: female   MRN: [id number] Encounter Date: 2021      History     Chief Complaint   Patient presents with   • Imm/Inj     HPI  5:57 PM Lydia Koenig is a 27 year old female  11 week pregnant presents to the ED for a methotrexate injection.  Patient states that she recently had a D&C however was then told that she has an ectopic this morning because she had a rising hcg level and US.  She has a history of 1 ectopic and 3 miscarriages in the past.  She was supposed to get a methotrexate injection today however her OB clinic was closed.  She was sent to the ED for the injection and instructed to call back tomorrow morning for follow-up for her 2nd injection.  She denies any pain or worsening bleeding at this time.  She states that she typically gets her OB care at [facility] however this her first time going through [facility].  She states that she is not pleased with her experience here at [facility].  She denies any other associated sx. There are no other modifying factors at this time.      5:57 PM I reviewed the pt's medications, allergies, and past medical and surgical history in Epic.  Patient was sent to the ED from her OB for her 1st of methotrexate for an ectopic pregnancy.  She had a D&C 3 days ago. Pt is Rh positive.    PCP: No Pcp    Allergies   Allergen Reactions   • Amoxicillin Other (See Comments)     Likely rash, patient not 100% sure.      She denies any SOB, or throat swelling       Discharge Medication List as of 2021  7:03 PM      Prior to Admission Medications    Details   acetaminophen (TYLENOL) 325 MG tablet Take 2 tablets by mouth every 6 hours as needed for Pain.OTC, Disp-30 tablet, R-0      ibuprofen (MOTRIN) 200 MG tablet Take 3 tablets by mouth every 6 hours as needed for Pain.OTC, Disp-30 tablet, R-1      sennosides-docusate sodium (SENOKOT-S) 8.6-50 MG tablet Take 1 tablet by mouth daily as needed for Constipation.OTC      Prenatal  01-Sep-2017 Vit-Fe Fumarate-FA (PRENATAL PO) Indications: 2 gummies dailyHistorical Med             Past Medical History:   Diagnosis Date   • ADHD    • Bladder infection    • Depression    • Mental disorder        Past Surgical History:   Procedure Laterality Date   • SALPINGECTOMY Left 2017    ruptured ectopic       Family History   Problem Relation Age of Onset   • Patient is unaware of any medical problems Daughter    • Patient is unaware of any medical problems Son    • Patient is unaware of any medical problems Son    • Patient is unaware of any medical problems Son        Social History     Tobacco Use   • Smoking status: Former Smoker     Packs/day: 0.25     Years: 13.00     Pack years: 3.25     Types: Cigarettes   • Smokeless tobacco: Never Used   • Tobacco comment: quit while pregnant   Substance Use Topics   • Alcohol use: Not Currently     Alcohol/week: 1.0 standard drink     Types: 1 Standard drinks or equivalent per week     Comment: week   • Drug use: No       E-cigarette/Vaping     E-Cigarette/Vaping Substances & Devices       Review of Systems   Constitutional: Negative for chills and fever.        Injection   HENT: Negative for congestion and sore throat.    Respiratory: Negative for cough and shortness of breath.    Cardiovascular: Negative for chest pain, palpitations and leg swelling.   Gastrointestinal: Negative for abdominal pain, blood in stool, diarrhea, nausea and vomiting.   Genitourinary: Negative for difficulty urinating, dysuria, flank pain and hematuria.   Musculoskeletal: Negative for arthralgias and neck pain.   Skin: Negative for rash.   Neurological: Negative for dizziness, weakness, light-headedness, numbness and headaches.       Physical Exam     ED Triage Vitals [09/16/21 1732]   ED Triage Vitals Group      Temp 98.6 °F (37 °C)      Heart Rate 78      Resp 18      /70      SpO2 99 %      EtCO2 mmHg       Height 5' 1\" (1.549 m)      Weight 165 lb (74.8 kg)      Weight Scale Used ED  Stated      BMI (Calculated) 31.18      IBW/kg (Calculated) 47.8       Physical Exam  Vitals and nursing note reviewed.   Constitutional:       General: She is not in acute distress.     Appearance: She is well-developed. She is not diaphoretic.   HENT:      Head: Normocephalic and atraumatic.   Eyes:      Conjunctiva/sclera: Conjunctivae normal.      Pupils: Pupils are equal, round, and reactive to light.   Cardiovascular:      Rate and Rhythm: Normal rate and regular rhythm.   Pulmonary:      Effort: Pulmonary effort is normal. No respiratory distress.      Breath sounds: Normal breath sounds. No wheezing, rhonchi or rales.   Abdominal:      Palpations: Abdomen is soft.   Musculoskeletal:         General: Normal range of motion.      Cervical back: Normal range of motion and neck supple.   Skin:     General: Skin is warm.   Neurological:      Mental Status: She is alert and oriented to person, place, and time.         ED Course     Procedures    Lab Results     No results found for this visit on 09/16/21.    Radiology Results     Imaging Results    None         ED Medication Orders (From admission, onward)    Ordered Start     Status Ordering Provider    09/16/21 1759 09/16/21 1800  methotrexate (PF) injection 87 mg  ONCE         Last MAR action: Given SACHIN LING        Vitals  Vitals:    09/16/21 1732 09/16/21 1929   BP: 109/70 100/64   BP Location: RUE - Right upper extremity LUE - Left upper extremity   Patient Position: Sitting Sitting   Pulse: 78 85   Resp: 18 18   Temp: 98.6 °F (37 °C)    TempSrc: Oral    SpO2: 99% 98%   Weight: 74.8 kg    Height: 5' 1\" (1.549 m)    LMP: 07/05/2021         ED Course  5:57 PM  Initial assessment and PEx were performed. The plan for ED workup, including methotrexate administration was discussed.  Informed her that she will need to be observed for 30 minutes in the ED following the methotrexate injection.  She was instructed to follow-up with her OB as scheduled and return  to the ED for any new pelvic pain or worsening bleeding.  The pt understands and agrees with the plan. All questions were addressed.           [facility]      Critical Care time spent on this patient outside of billable procedures:  None     Clinical Impression  ED Diagnosis        Final diagnosis    Encounter for medication administration                Follow Up:  with your OB tomorrow for a follow up appt                Summary of your Discharge Medications      You have not been prescribed any medications.         Pt is discharged to home/self care in stable condition.         Galina Ramirez PA-C  Dictation # [id number]    Attending Physician: Dr. Herman Callejas  Dictation #  [id number]                 Galina Ramirez PA-C  09/16/21 4108     [facility]

## 2021-11-29 ENCOUNTER — NON-APPOINTMENT (OUTPATIENT)
Age: 26
End: 2021-11-29

## 2021-11-29 ENCOUNTER — APPOINTMENT (OUTPATIENT)
Dept: ORTHOPEDIC SURGERY | Facility: CLINIC | Age: 26
End: 2021-11-29
Payer: COMMERCIAL

## 2021-11-29 VITALS
BODY MASS INDEX: 28.12 KG/M2 | WEIGHT: 175 LBS | HEIGHT: 66 IN | OXYGEN SATURATION: 98 % | DIASTOLIC BLOOD PRESSURE: 71 MMHG | HEART RATE: 73 BPM | SYSTOLIC BLOOD PRESSURE: 114 MMHG

## 2021-11-29 DIAGNOSIS — M54.16 RADICULOPATHY, LUMBAR REGION: ICD-10-CM

## 2021-11-29 PROCEDURE — 99204 OFFICE O/P NEW MOD 45 MIN: CPT

## 2021-11-29 PROCEDURE — 72100 X-RAY EXAM L-S SPINE 2/3 VWS: CPT

## 2021-11-29 RX ORDER — PREDNISONE 50 MG/1
50 TABLET ORAL DAILY
Qty: 5 | Refills: 0 | Status: ACTIVE | COMMUNITY
Start: 2021-11-29 | End: 1900-01-01

## 2021-11-29 RX ORDER — CYCLOBENZAPRINE HYDROCHLORIDE 5 MG/1
5 TABLET, FILM COATED ORAL
Qty: 28 | Refills: 0 | Status: ACTIVE | COMMUNITY
Start: 2021-11-29 | End: 1900-01-01

## 2021-11-29 NOTE — REASON FOR VISIT
Allergies and medications reviewed with patient.  Pt has been NPO since midnight. Two patient identifiers utilized.      [Initial Visit] : an initial visit for [Other: ____] : [unfilled]

## 2021-12-05 NOTE — HISTORY OF PRESENT ILLNESS
[de-identified] : TORIE REYNOLDS is a 26F with history of chronic LBP, who presents for initial evaluation of acute on chronic back pain x1 month without inciting event. Reports history of LBP; had MRI L spine in 2019 for LBP radiating b/l LEs; states it showed 2 disc herniations, though patient cannot recall which levels and currently without imaging with her. No recent xrays. Reports pain is currently exacerbated without prolonged sitting, but relieved with standing and activity; begins in the low back L>R and radiates down her lateral thigh to the knees. Taking advil as needed for pain; not in PT and has not received other modalities for pain. Denied associated numbness or tingling.

## 2021-12-05 NOTE — PHYSICAL EXAM
[de-identified] : General: Alert. No acute distress.\par Skin: Inspection grossly negative for erythema, breakdown, or concerning lesions in affected area. \par Lung: Breathing is comfortable and regular.  \par Cardiovascular: Warm and well perfused\par \par LUMBAR SPINE:\par Range of motion testing shows limited flexion 2/2 pain, painless extension, and mild discomfort when rotating body to the left\par Facet loading maneuver with mild discomfort b/l\par No tenderness to palpation of paraspinal muscles.\par Straight leg positive on the left, though on the right, causes localized LBP\par \par HIPS/PELVIS -\par Range of motion shows WNL\par passive hip impingement sign negative b/l\par JUAN LUIS positive for LBP on the left\par Pain SI compression test and tender to palpation over left SIJ region\par (+) Yeomans on the left\par Sensation - intact to light touch in bilateral lower extremities. [de-identified] : \par The following radiographs were ordered and read by me during this patient's visit. I reviewed each radiograph in detail with the patient and discussed the findings as highlighted below. \par \par 2 views of the lumbar spine were obtained today that show no fracture, or dislocation. There are no degenerative changes seen. There is no malalignment. No obvious osseous abnormality. Otherwise unremarkable.

## 2021-12-05 NOTE — ASSESSMENT
[FreeTextEntry1] : TORIE REYNOLDS is a 26F with history of chronic LBP, who presents for initial evaluation of acute on chronic back pain x1 month without inciting event. Xrays lumbar spine taken in the office negative for acute bony pathology. Suspect symptoms likely flare of lumbar radiculopathy.\par \par - start on Medrol dosepak; prescription provide\par - cyclobenzaprine as needed\par - start PT and HEP\par - RTC in 6 week for further management

## 2021-12-10 ENCOUNTER — TRANSCRIPTION ENCOUNTER (OUTPATIENT)
Age: 26
End: 2021-12-10

## 2021-12-28 ENCOUNTER — FORM ENCOUNTER (OUTPATIENT)
Age: 26
End: 2021-12-28

## 2022-03-17 RX ORDER — LEVOTHYROXINE SODIUM 0.11 MG/1
112 TABLET ORAL
Qty: 60 | Refills: 0 | Status: ACTIVE | COMMUNITY
Start: 2020-09-18 | End: 1900-01-01

## 2022-04-11 ENCOUNTER — RX RENEWAL (OUTPATIENT)
Age: 27
End: 2022-04-11

## 2024-08-26 ENCOUNTER — NON-APPOINTMENT (OUTPATIENT)
Age: 29
End: 2024-08-26
